# Patient Record
Sex: MALE | URBAN - METROPOLITAN AREA
[De-identification: names, ages, dates, MRNs, and addresses within clinical notes are randomized per-mention and may not be internally consistent; named-entity substitution may affect disease eponyms.]

---

## 2017-09-28 ENCOUNTER — APPOINTMENT (RX ONLY)
Dept: URBAN - METROPOLITAN AREA CLINIC 83 | Facility: CLINIC | Age: 62
Setting detail: DERMATOLOGY
End: 2017-09-28

## 2017-09-28 DIAGNOSIS — B07.8 OTHER VIRAL WARTS: ICD-10-CM

## 2017-09-28 DIAGNOSIS — K13.0 DISEASES OF LIPS: ICD-10-CM

## 2017-09-28 DIAGNOSIS — H00.1 CHALAZION: ICD-10-CM

## 2017-09-28 PROBLEM — H00.11 CHALAZION RIGHT UPPER EYELID: Status: ACTIVE | Noted: 2017-09-28

## 2017-09-28 PROBLEM — I10 ESSENTIAL (PRIMARY) HYPERTENSION: Status: ACTIVE | Noted: 2017-09-28

## 2017-09-28 PROCEDURE — ? INTRALESIONAL KENALOG

## 2017-09-28 PROCEDURE — 11900 INJECT SKIN LESIONS </W 7: CPT | Mod: 59

## 2017-09-28 PROCEDURE — ? COUNSELING

## 2017-09-28 PROCEDURE — 11055 PARING/CUTG B9 HYPRKER LES 1: CPT

## 2017-09-28 PROCEDURE — 99203 OFFICE O/P NEW LOW 30 MIN: CPT | Mod: 25

## 2017-09-28 PROCEDURE — ? PARING HYPERKERATOTIC LESION

## 2017-09-28 PROCEDURE — ? PRESCRIPTION

## 2017-09-28 RX ORDER — KETOCONAZOLE 20 MG/G
CREAM TOPICAL BID
Qty: 1 | Refills: 3 | Status: ERX | COMMUNITY
Start: 2017-09-28

## 2017-09-28 RX ORDER — HYDROCORTISONE 25 MG/G
CREAM TOPICAL BID
Qty: 1 | Refills: 2 | Status: ERX | COMMUNITY
Start: 2017-09-28

## 2017-09-28 RX ADMIN — KETOCONAZOLE: 20 CREAM TOPICAL at 17:08

## 2017-09-28 RX ADMIN — HYDROCORTISONE: 25 CREAM TOPICAL at 17:08

## 2017-09-28 ASSESSMENT — LOCATION ZONE DERM
LOCATION ZONE: EYELID
LOCATION ZONE: FINGER

## 2017-09-28 ASSESSMENT — LOCATION SIMPLE DESCRIPTION DERM
LOCATION SIMPLE: RIGHT INDEX FINGER
LOCATION SIMPLE: RIGHT LATERAL SUPERIOR TARSAL REGION

## 2017-09-28 ASSESSMENT — LOCATION DETAILED DESCRIPTION DERM
LOCATION DETAILED: RIGHT MID DORSAL INDEX FINGER
LOCATION DETAILED: RIGHT LATERAL SUPERIOR TARSAL REGION

## 2017-09-28 NOTE — PROCEDURE: INTRALESIONAL KENALOG
Treatment Number (Optional): 1
Detail Level: Zone
Total Volume Injected (Ccs- Only Use Numbers And Decimals): 0.1
Kenalog Preparation: Kenalog
Administered By (Optional): Brooklyn Reza
Consent: The risks of atrophy were reviewed with the patient.
Concentration Of Solution Injected (Mg/Ml): 3.0
Expiration Date (Optional): April 1, 2018
Lot # (Optional): [id number]
Medical Necessity Clause: This procedure was medically necessary because the lesions that were treated were:
Size Of Lesion (Optional): 0.6
X Size Of Lesion In Cm (Optional): 0
Include Z78.9 (Other Specified Conditions Influencing Health Status) As An Associated Diagnosis?: No

## 2017-10-12 ENCOUNTER — APPOINTMENT (RX ONLY)
Dept: URBAN - METROPOLITAN AREA CLINIC 83 | Facility: CLINIC | Age: 62
Setting detail: DERMATOLOGY
End: 2017-10-12

## 2017-10-12 DIAGNOSIS — H00.1 CHALAZION: ICD-10-CM

## 2017-10-12 PROBLEM — H00.19 CHALAZION UNSPECIFIED EYE, UNSPECIFIED EYELID: Status: ACTIVE | Noted: 2017-10-12

## 2017-10-12 PROCEDURE — 99213 OFFICE O/P EST LOW 20 MIN: CPT

## 2017-10-12 PROCEDURE — ? COUNSELING
